# Patient Record
Sex: FEMALE | Race: WHITE
[De-identification: names, ages, dates, MRNs, and addresses within clinical notes are randomized per-mention and may not be internally consistent; named-entity substitution may affect disease eponyms.]

---

## 2018-02-15 ENCOUNTER — HOSPITAL ENCOUNTER (EMERGENCY)
Dept: HOSPITAL 77 - KA.ED | Age: 53
Discharge: NURSING, REHAB OR LONG-TERM CARE | End: 2018-02-15
Payer: MEDICARE

## 2018-02-15 VITALS — DIASTOLIC BLOOD PRESSURE: 70 MMHG | SYSTOLIC BLOOD PRESSURE: 125 MMHG
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
## 2018-02-15 DIAGNOSIS — S00.83XA: ICD-10-CM

## 2018-02-15 DIAGNOSIS — Z79.899: ICD-10-CM

## 2018-02-15 DIAGNOSIS — S39.012A: ICD-10-CM

## 2018-02-15 DIAGNOSIS — Z88.8: ICD-10-CM

## 2018-02-15 DIAGNOSIS — S16.1XXA: Primary | ICD-10-CM

## 2018-02-15 DIAGNOSIS — Z88.0: ICD-10-CM
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
## 2018-02-15 DIAGNOSIS — Z87.891: ICD-10-CM

## 2018-02-15 LAB
CHLORIDE SERPL-SCNC: 111 MMOL/L (ref 98–115)
SODIUM SERPL-SCNC: 149 MMOL/L (ref 136–145)

## 2018-02-15 NOTE — EDM.PDOC
ED HPI GENERAL MEDICAL PROBLEM





- General


Chief Complaint: General


Stated Complaint: Fall with pain pain to neck/back


Time Seen by Provider: 02/15/18 06:35


Source of Information: Reports: Patient, EMS, Nursing Home Records


History Limitations: Reports: No Limitations





- History of Present Illness


INITIAL COMMENTS - FREE TEXT/NARRATIVE: 





53 YO WF presents to ER by EMS from NH after fall this am. Pt states she has 

poor eyesight and was walking back from the bathroom and fell. Pt unsure if she 

tripped/slipped and unsure if she lost consciousness. Pt complaining of 

posterior head pain and neck pain as well as low back pain. Pt currently in NAd 

and alert and oriented x 3. Pt denies any recent illness, denies chest pain or 

shortness of breath. 


Onset: Today


Duration: Hour(s): (1)


Location: Reports: Head, Neck, Back


Quality: Reports: Ache


Severity: Mild


Improves with: Reports: None


Worsens with: Reports: None


Associated Symptoms: Reports: No Other Symptoms.  Denies: Chest Pain, Cough, 

Diaphoresis, Nausea/Vomiting, Seizure, Shortness of Breath





- Related Data


 Allergies











Allergy/AdvReac Type Severity Reaction Status Date / Time


 


carbamazepine [From Tegretol] Allergy  Cannot Verified 02/15/18 07:01





   Remember  


 


penicillin Allergy  Rash Verified 02/15/18 07:01











Home Meds: 


 Home Meds





Acetaminophen 650 mg PO Q6H PRN 07/05/15 [History]


Aspirin [Halfprin] 81 mg PO BRK 07/05/15 [History]


Cholecalciferol (Vitamin D3) [Vitamin D] 2,000 unit PO DAILY 07/05/15 [History]


Omeprazole [Omeprazole] 20 mg PO ACBREAKFAST 07/05/15 [History]


cloZAPine [Clozapine] 500 mg PO BEDTIME 07/05/15 [History]


clonazePAM [Clonazepam] 1 mg PO DAILY 07/05/15 [History]


Albuterol/Ipratropium [DuoNeb 3.0-0.5 MG/3 ML] 3 ml INH Q6H PRN 02/15/18 [

History]


Benztropine [Cogentin] 1 mg PO BID 02/15/18 [History]


Bisacodyl [Biscolax] 1 supp RECTAL DAILY PRN 02/15/18 [History]


ClonazePAM [KlonoPIN] 2 mg PO BEDTIME 02/15/18 [History]


Divalproex Sodium [Depakote Sprinkle] 1,500 mg PO BEDTIME 02/15/18 [History]


Iron Polysaccharides Complex [Ferrex 150] 150 cap PO DAILY 02/15/18 [History]


Lurasidone HCl [Latuda] 120 mg PO BEDTIME 02/15/18 [History]


Magnesium Hydroxide [Milk of Magnesia] 30 ml PO DAILY PRN 02/15/18 [History]


Magnesium Oxide [Magnesium] 400 mg PO DAILY@1200 02/15/18 [History]


Non-Formulary Medication [NF Drug] 1 cap PO DAILY 02/15/18 [History]


Polyethylene Glycol 3350 [MiraLAX] 1 pkt PO ASDIRECTED 02/15/18 [History]


Prazosin [Minpress] 2 mg PO BEDTIME 02/15/18 [History]


Sennosides [Senna] 8.6 mg PO DAILY 02/15/18 [History]


cloZAPine 250 mg PO DAILY 02/15/18 [History]


traMADol [Ultram] 50 mg PO Q6H PRN #15 tab 02/15/18 [Rx]











Past Medical History


Other Genitourinary History: renal failure


Other OB/BYN History: Ca in situ - ovarian





- Past Surgical History


Other Musculoskeletal Surgeries/Procedures:: knee repl bilat.  fx ankle





Social & Family History





- Tobacco Use


Smoking Status *Q: Former Smoker


Second Hand Smoke Exposure: No





- Recreational Drug Use


Recreational Drug Use: No





ED ROS GENERAL





- Review of Systems


Review Of Systems: See Below


Constitutional: Reports: No Symptoms


HEENT: Reports: No Symptoms


Respiratory: Reports: No Symptoms


Cardiovascular: Reports: No Symptoms


Endocrine: Reports: No Symptoms


GI/Abdominal: Reports: No Symptoms


: Reports: No Symptoms


Musculoskeletal: Reports: Neck Pain, Back Pain


Skin: Reports: No Symptoms


Neurological: Reports: Headache


Psychiatric: Reports: No Symptoms


Hematologic/Lymphatic: Reports: No Symptoms


Immunologic: Reports: No Symptoms





ED EXAM, GENERAL





- Physical Exam


Exam: See Below


Exam Limited By: No Limitations


General Appearance: Alert, WD/WN, No Apparent Distress


Eye Exam: Bilateral Eye: EOMI, PERRL


Ears: Normal External Exam, Normal Canal, Hearing Grossly Normal, Normal TMs


Head: Atraumatic, Normocephalic


Neck: Supple, Tender Lateral


Respiratory/Chest: No Respiratory Distress, Lungs Clear, Normal Breath Sounds, 

No Accessory Muscle Use, Chest Non-Tender


Cardiovascular: Normal Peripheral Pulses, Regular Rate, Rhythm, No Edema, No 

Gallop, No JVD, No Murmur, No Rub


GI/Abdominal: Normal Bowel Sounds, Soft, Non-Tender, No Organomegaly, No 

Distention, No Abnormal Bruit, No Mass


Back Exam: Paraspinal Tenderness


Extremities: Normal Inspection, Normal Range of Motion, Non-Tender, Normal 

Capillary Refill, No Pedal Edema


Neurological: Alert, Oriented, CN II-XII Intact, Normal Cognition, Normal Gait, 

Normal Reflexes, No Motor/Sensory Deficits


Psychiatric: Normal Affect, Normal Mood


Skin Exam: Warm, Dry, Intact, Normal Color, No Rash


Lymphatic: No Adenopathy





EKG INTERPRETATION


EKG Date: 02/15/18


Time: 07:06


Rhythm: NSR


Rate (Beats/Min): 83


Axis: Normal


P-Wave: Present


QRS: Normal


ST-T: Normal


QT: Normal


Comparison: NA - No Prior EKG





Course





- Vital Signs


Last Recorded V/S: 


 Last Vital Signs











Temp  35.8 C   02/15/18 06:41


 


Pulse  87   02/15/18 06:41


 


Resp  18   02/15/18 06:41


 


BP  125/70   02/15/18 06:41


 


Pulse Ox  99   02/15/18 06:41














- Orders/Labs/Meds


Orders: 


 Active Orders 24 hr











 Category Date Time Status


 


 EKG Documentation Completion [RC] ASDIRECTED Care  02/15/18 06:48 Active


 


 Abdomen Pelvis wo Cont [CT] Stat Exams  02/15/18 08:44 Taken


 


 Cervical Spine wo Cont [CT] Stat Exams  02/15/18 06:47 Taken


 


 Chest 1V Frontal [CR] Stat Exams  02/15/18 06:47 Taken


 


 Head wo Cont [CT] Stat Exams  02/15/18 06:47 Taken


 


 Lumbar Spine 2 or 3V [CR] Stat Exams  02/15/18 06:47 Taken


 


 EKG 12 Lead [EK] Routine Ther  02/15/18 06:47 Ordered











Labs: 


 Laboratory Tests











  02/15/18 02/15/18 02/15/18 Range/Units





  07:00 07:00 07:52 


 


WBC  6.1    (5.0-10.0)  10^3/uL


 


RBC  4.41    (3.80-5.50)  10^6/uL


 


Hgb  14.1    (12.0-16.0)  g/dL


 


Hct  43.4    (37.0-47.0)  %


 


MCV  98.5 H    (82.0-92.0)  fL


 


MCH  32.0 H    (27.0-31.0)  pg


 


MCHC  32.5    (32.0-36.0)  g/dL


 


RDW  14.4    (11.5-14.5)  %


 


Plt Count  186    (150-300)  10^3/uL


 


MPV  9.4    (7.4-10.4)  fL


 


Neut % (Auto)  61.8    (50.0-70.0)  %


 


Lymph % (Auto)  21.7    (20.0-40.0)  %


 


Mono % (Auto)  12.3 H    (2.0-8.0)  %


 


Eos % (Auto)  2.3    (1.0-3.0)  %


 


Baso % (Auto)  1.9 H    (0.0-1.0)  %


 


Neut # (Auto)  3.8    (2.5-7.0)  10^3/uL


 


Lymph # (Auto)  1.3    (1.0-4.0)  10^3/uL


 


Mono # (Auto)  0.8    (0.1-0.8)  10^3/uL


 


Eos # (Auto)  0.1    (0.1-0.3)  10^3/uL


 


Baso # (Auto)  0.1    (0.0-0.1)  10^3/uL


 


Sodium   149 H   (136-145)  mmol/L


 


Potassium   4.2   (3.3-5.3)  mmol/L


 


Chloride   111   ()  mmol/L


 


Carbon Dioxide   28.8   (21.0-32.0)  mmol/L


 


BUN   14   (6-25)  mg/dL


 


Creatinine   0.93   (0.51-1.17)  mg/dL


 


Est Cr Clr Drug Dosing   78.19   mL/min


 


Estimated GFR (MDRD)   > 60   mL/min


 


Glucose   100   ()  mg/dL


 


Calcium   9.0   (8.7-10.3)  mg/dL


 


Total Bilirubin   0.3   (0.2-1.0)  mg/dL


 


AST   19   (15-37)  U/L


 


ALT   18   (12-78)  U/L


 


Alkaline Phosphatase   91   ()  IU/L


 


Creatine Kinase   47   ()  U/L


 


CK-MB (CK-2)   < 0.50   (0.00-4.30)  ng/mL


 


Troponin I   < 0.04   (0.00-0.070)  ng/mL


 


Total Protein   6.9   (6.4-8.2)  g/dL


 


Albumin   2.82 L   (3.00-4.80)  g/dL


 


Specimen Type    Urincath  


 


Urine Color    Yellow  (YELLOW)  


 


Urine Appearance    Slightly cloudy H  (CLEAR)  


 


Urine pH    >= 9.0  (5.0-9.0)  


 


Ur Specific Gravity    1.015  (1.005-1.030)  


 


Urine Protein    Negative  (NEGATIVE)  mg/dL


 


Urine Glucose (UA)    Negative  (NEGATIVE)  mg/dL


 


Urine Ketones    Trace H  (NEGATIVE)  mg/dL


 


Urine Occult Blood    Moderate H  (NEGATIVE)  


 


Urine Nitrite    Negative  (NEGATIVE)  


 


Urine Bilirubin    Negative  (NEGATIVE)  


 


Urine Urobilinogen    0.2  (0.2-1.0)  E.U./dL


 


Ur Leukocyte Esterase    Trace H  (NEGATIVE)  


 


Urine RBC    Not Reportable  


 


Urine WBC    Not Reportable  


 


Urinalysis Comment    See note  











Meds: 


Medications














Discontinued Medications














Generic Name Dose Route Start Last Admin





  Trade Name Freq  PRN Reason Stop Dose Admin


 


Morphine Sulfate  2 mg  02/15/18 08:44  02/15/18 08:49





  Morphine  IVPUSH  02/15/18 08:45  2 mg





  ONETIME ONE   Administration














- Radiology Interpretation


Free Text/Narrative:: 





CT cervical- NAd


CT Head- NAD


CXR- NAD


CT abd/pelvis- Possible Paget's Dz








Departure





- Departure


Time of Disposition: 10:15


Disposition: DC/Tfer to SNF [facility]


Condition: Fair


Clinical Impression: 


Head contusion


Qualifiers:


 Encounter type: initial encounter Contusion of head detail: other part of head 

Qualified Code(s): S00.83XA - Contusion of other part of head, initial encounter





Cervical strain, acute


Qualifiers:


 Encounter type: initial encounter Qualified Code(s): S16.1XXA - Strain of 

muscle, fascia and tendon at neck level, initial encounter





Lumbar strain


Qualifiers:


 Encounter type: initial encounter Qualified Code(s): S39.012A - Strain of 

muscle, fascia and tendon of lower back, initial encounter








- Discharge Information


Prescriptions: 


traMADol [Ultram] 50 mg PO Q6H PRN #15 tab


 PRN Reason: Pain


Instructions:  Contusion, Back Pain, Adult, Cervical Sprain, Easy-to-Read


Referrals: 


Gale Taveras MD [Primary Care Provider] - 


Forms:  ED Department Discharge


Additional Instructions: 


1. discharge to nursing home


2. ultram 50mg PO Q6 PRN for pain


3. consider PT eval and treat


4. follow up with PCP for further evaluation and treatment


5. return to ER for worsening symptoms





- My Orders


Last 24 Hours: 


My Active Orders





02/15/18 06:47


Cervical Spine wo Cont [CT] Stat 


Chest 1V Frontal [CR] Stat 


Head wo Cont [CT] Stat 


Lumbar Spine 2 or 3V [CR] Stat 


EKG 12 Lead [EK] Routine 





02/15/18 06:48


EKG Documentation Completion [RC] ASDIRECTED 





02/15/18 08:44


Abdomen Pelvis wo Cont [CT] Stat 














- Assessment/Plan


Last 24 Hours: 


My Active Orders





02/15/18 06:47


Cervical Spine wo Cont [CT] Stat 


Chest 1V Frontal [CR] Stat 


Head wo Cont [CT] Stat 


Lumbar Spine 2 or 3V [CR] Stat 


EKG 12 Lead [EK] Routine 





02/15/18 06:48


EKG Documentation Completion [RC] ASDIRECTED 





02/15/18 08:44


Abdomen Pelvis wo Cont [CT] Stat 











Assessment:: 





1. Fall


2. cervical strain


3. head contusion


4. low back pain


Plan: 





1. discharge to nursing home


2. ultram 50mg PO Q6 PRN for pain


3. consider PT eval and treat


4. follow up with PCP for further evaluation and treatment


5. return to ER for worsening symptoms

## 2019-01-21 ENCOUNTER — HOSPITAL ENCOUNTER (EMERGENCY)
Dept: HOSPITAL 77 - KA.ED | Age: 54
Discharge: NURSING, REHAB OR LONG-TERM CARE | End: 2019-01-21
Payer: MEDICARE

## 2019-01-21 VITALS — SYSTOLIC BLOOD PRESSURE: 117 MMHG | DIASTOLIC BLOOD PRESSURE: 70 MMHG

## 2019-01-21 DIAGNOSIS — M54.5: Primary | ICD-10-CM

## 2019-01-21 DIAGNOSIS — Z79.899: ICD-10-CM

## 2019-01-21 DIAGNOSIS — Z88.1: ICD-10-CM
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
## 2019-01-21 DIAGNOSIS — Z88.8: ICD-10-CM

## 2019-01-21 DIAGNOSIS — Z79.82: ICD-10-CM

## 2019-01-21 NOTE — EDM.PDOC
ED HPI GENERAL MEDICAL PROBLEM





- General


Chief Complaint: Back Pain or Injury


Stated Complaint: FELL


Time Seen by Provider: 01/21/19 21:18


Source of Information: Reports: Patient, EMS, EMS Notes Reviewed


History Limitations: Reports: No Limitations





- History of Present Illness


INITIAL COMMENTS - FREE TEXT/NARRATIVE: 





Patient is a 53-year-old female who presents via EMS to the emergency 

department with a complaint of low back pain.  Patient states that she was 

trying to back into bed and accidentally sat down before she was on the edge of 

the bed.  She landed on her buttocks on the floor.  Patient is developmentally 

challenged however, consistent with her story.  Same scenario depicted by EMS 

medic.  Patient denies shortness of breath, chest pain, headache, head injury, 

bladder or bowel incontinence, or inability to use lower extremities.


Onset: Today


Duration: Minutes:


Location: Reports: Back


Quality: Reports: Ache


Severity: Mild


Improves with: Reports: None


Worsens with: Reports: None


Context: Reports: Trauma


Associated Symptoms: Reports: No Other Symptoms





- Related Data


 Allergies











Allergy/AdvReac Type Severity Reaction Status Date / Time


 


carbamazepine [From Tegretol] Allergy  Cannot Verified 02/15/18 07:01





   Remember  


 


penicillin Allergy  Rash Verified 02/15/18 07:01











Home Meds: 


 Home Meds





Acetaminophen 650 mg PO Q6H PRN 07/05/15 [History]


Aspirin [Halfprin] 81 mg PO BRK 07/05/15 [History]


Cholecalciferol (Vitamin D3) [Vitamin D] 2,000 unit PO DAILY 07/05/15 [History]


Omeprazole 20 mg PO ACBREAKFAST 07/05/15 [History]


cloZAPine [Clozapine] 500 mg PO BEDTIME 07/05/15 [History]


clonazePAM [Clonazepam] 1 mg PO DAILY 07/05/15 [History]


Albuterol/Ipratropium [DuoNeb 3.0-0.5 MG/3 ML] 3 ml INH Q6H PRN 02/15/18 [

History]


Benztropine [Cogentin] 1 mg PO BID 02/15/18 [History]


Bisacodyl [Biscolax] 1 supp RECTAL DAILY PRN 02/15/18 [History]


ClonazePAM [KlonoPIN] 2 mg PO BEDTIME 02/15/18 [History]


Divalproex Sodium [Depakote Sprinkle] 1,500 mg PO BEDTIME 02/15/18 [History]


Iron Polysaccharides Complex [Ferrex 150] 150 cap PO DAILY 02/15/18 [History]


Lurasidone HCl [Latuda] 120 mg PO BEDTIME 02/15/18 [History]


Magnesium Hydroxide [Milk of Magnesia] 30 ml PO DAILY PRN 02/15/18 [History]


Magnesium Oxide [Magnesium] 400 mg PO DAILY@1200 02/15/18 [History]


Non-Formulary Medication [NF Drug] 1 cap PO DAILY 02/15/18 [History]


Polyethylene Glycol 3350 [MiraLAX] 1 pkt PO ASDIRECTED 02/15/18 [History]


Prazosin [Minpress] 2 mg PO BEDTIME 02/15/18 [History]


Sennosides [Senna] 8.6 mg PO DAILY 02/15/18 [History]


cloZAPine 250 mg PO DAILY 02/15/18 [History]


traMADol [Ultram] 50 mg PO Q6H PRN #15 tab 02/15/18 [Rx]











Past Medical History


Other Genitourinary History: renal failure


Other OB/GYN History: Ca in situ - ovarian





- Past Surgical History


Other Musculoskeletal Surgeries/Procedures:: knee repl bilat.  fx ankle





ED ROS GENERAL





- Review of Systems


Review Of Systems: ROS reveals no pertinent complaints other than HPI.


Constitutional: Reports: No Symptoms


HEENT: Reports: No Symptoms


Respiratory: Reports: No Symptoms


Cardiovascular: Reports: No Symptoms


Endocrine: Reports: No Symptoms


GI/Abdominal: Reports: No Symptoms


: Reports: No Symptoms


Musculoskeletal: Reports: Back Pain


Skin: Reports: No Symptoms


Neurological: Reports: No Symptoms


Psychiatric: Reports: No Symptoms


Hematologic/Lymphatic: Reports: No Symptoms


Immunologic: Reports: No Symptoms





ED EXAM,LOWER BACK PAIN/INJURY





- Physical Exam


Exam: See Below


Exam Limited By: No Limitations


General Appearance: Alert, WD/WN, No Apparent Distress


Throat/Mouth: Normal Inspection, Normal Oropharynx, No Airway Compromise


Head: Atraumatic, Normocephalic


Neck: Normal Inspection, Supple, Non-Tender, Full Range of Motion


Respiratory/Chest: No Respiratory Distress, Lungs Clear, Normal Breath Sounds, 

No Accessory Muscle Use, Chest Non-Tender


Cardiovascular: Regular Rate, Rhythm, No Murmur


GI/Abdominal: Normal Bowel Sounds, Soft, Non-Tender, Pelvis Stable (No 

tenderness on palpation)


Back Exam: Normal Inspection, Full Range of Motion, Other (No pain on palpation

, no ecchymosis, no step off.).  No: CVA Tenderness (L), CVA Tenderness (R), 

Decreased Range of Motion, Paraspinal Tenderness, Vertebral Tenderness


Extremities: Normal Inspection, Normal Range of Motion, Non-Tender, No Pedal 

Edema


Neurological: Alert, Normal Mood/Affect, Normal Dorsiflexion, Straight Leg 

Raise (L), Straight Leg Raise (R).  No: Saddle Anesthesia


Psychiatric: Normal Affect, Normal Mood


Skin Exam: Warm, Dry, Intact, Normal Color, No Rash





Course





- Orders/Labs/Meds


Orders: 





 Active Orders 24 hr











 Category Date Time Status


 


 Lumbar Spine 2 or 3V [CR] Stat Exams  01/21/19 21:19 Ordered














- Radiology Interpretation


Free Text/Narrative:: 





Lumbar sacral x-ray shows degenerative changes but no acute fracture





- Re-Assessments/Exams


Free Text/Narrative Re-Assessment/Exam: 





01/21/19 22:06


Patient afebrile, nontoxic appearing, vital signs stable, denies any 

discomfort.  Patient will be returned to facility and have follow-up with PCP





Departure





- Departure


Time of Disposition: 22:08


Disposition: DC/Tfer to [facility]


Condition: Good


Clinical Impression: 


 Fall in elderly patient





Back pain


Qualifiers:


 Back pain location: low back pain Chronicity: acute Back pain laterality: 

unspecified Sciatica presence: without sciatica Qualified Code(s): M54.5 - Low 

back pain








- Discharge Information


Instructions:  Fall Prevention in the Home, Easy-to-Read, Back Pain, Adult, Easy

-to-Read, Muscle Strain, Easy-to-Read


Referrals: 


Gale Taveras MD [Primary Care Provider] - 


Forms:  ED Department Discharge


Additional Instructions: 


Follow-up with PCP in next 2-3 days.  Return to emergency department sooner if 

symptoms continue or worsen.





- My Orders


Last 24 Hours: 





My Active Orders





01/21/19 21:19


Lumbar Spine 2 or 3V [CR] Stat 














- Assessment/Plan


Last 24 Hours: 





My Active Orders





01/21/19 21:19


Lumbar Spine 2 or 3V [CR] Stat 











Assessment:: 





Fall, low back pain


Plan: 





Follow-up with PCP

## 2019-01-22 NOTE — CR
______________________________________________________________________________   

  

[id number] RAD/RAD Lumbar Spine 2-3V  

EXAM: LUMBAR SPINE 3 VIEWS  

   

 INDICATION: Fall.  

   

 COMPARISON: February 15, 2018.  

   

 DISCUSSION:   

 Grade 1 L5-S1 spondylolisthesis and mild convex right lumbar spine curvature  

 have not changed. There is mild to moderate degenerative disc disease throughout  

 the lumbar spine and bilateral lower lumbar facet degeneration. A hemangioma is  

 suggested in the L1 vertebral body and a stable bone island is suggested in the  

 left iliac bone. No acute fracture is identified. Multiple surgical clips  

 overlie the lower lumbar spine.  

   

 IMPRESSION:  

 1.  No acute findings.  

 2.  Unchanged grade 1 L5-S1 spondylolisthesis relating to bilateral pars  

 defects.  

 3.  Unchanged moderate lumbar spondylosis.  

   

 Electronically signed by Armen Beal MD on 1/22/2019 8:05 AM  

   

  

Armen Beal MD                 

 01/22/19 [id number]    

  

Thank you for allowing us to participate in the care of your patient.

## 2023-03-07 ENCOUNTER — HOSPITAL ENCOUNTER (OUTPATIENT)
Dept: HOSPITAL 77 - KA.SDS | Age: 58
Discharge: HOME | End: 2023-03-07
Attending: FAMILY MEDICINE
Payer: MEDICARE

## 2023-03-07 VITALS — DIASTOLIC BLOOD PRESSURE: 78 MMHG | SYSTOLIC BLOOD PRESSURE: 147 MMHG | HEART RATE: 70 BPM

## 2023-03-07 DIAGNOSIS — Z79.899: ICD-10-CM

## 2023-03-07 DIAGNOSIS — F25.9: ICD-10-CM

## 2023-03-07 DIAGNOSIS — R19.7: Primary | ICD-10-CM

## 2023-03-07 DIAGNOSIS — K21.00: ICD-10-CM

## 2023-03-07 DIAGNOSIS — Z88.8: ICD-10-CM

## 2023-03-07 DIAGNOSIS — Z88.0: ICD-10-CM

## 2023-03-07 DIAGNOSIS — G43.909: ICD-10-CM

## 2023-03-07 DIAGNOSIS — F43.10: ICD-10-CM

## 2023-03-07 DIAGNOSIS — F41.9: ICD-10-CM

## 2023-03-07 DIAGNOSIS — F60.3: ICD-10-CM

## 2023-03-07 DIAGNOSIS — R63.4: ICD-10-CM

## 2023-03-07 DIAGNOSIS — I10: ICD-10-CM

## 2023-03-07 DIAGNOSIS — Z53.09: ICD-10-CM

## 2023-03-07 DIAGNOSIS — E11.9: ICD-10-CM

## 2023-03-07 DIAGNOSIS — E78.5: ICD-10-CM

## 2023-03-07 DIAGNOSIS — M19.90: ICD-10-CM

## 2023-03-07 DIAGNOSIS — Z87.891: ICD-10-CM
